# Patient Record
Sex: MALE | Race: WHITE
[De-identification: names, ages, dates, MRNs, and addresses within clinical notes are randomized per-mention and may not be internally consistent; named-entity substitution may affect disease eponyms.]

---

## 2019-02-11 ENCOUNTER — HOSPITAL ENCOUNTER (EMERGENCY)
Dept: HOSPITAL 36 - ER | Age: 49
Discharge: HOME | End: 2019-02-11
Payer: MEDICARE

## 2019-02-11 DIAGNOSIS — R45.851: ICD-10-CM

## 2019-02-11 DIAGNOSIS — F32.9: Primary | ICD-10-CM

## 2019-02-11 DIAGNOSIS — B20: ICD-10-CM

## 2019-02-11 DIAGNOSIS — F10.10: ICD-10-CM

## 2019-02-11 LAB
ALBUMIN SERPL-MCNC: 4.6 GM/DL (ref 4.2–5.5)
ALBUMIN/GLOB SERPL: 1.5 {RATIO} (ref 1–1.8)
ALP SERPL-CCNC: 83 U/L (ref 34–104)
ALT SERPL-CCNC: 50 U/L (ref 7–52)
AMPHET UR-MCNC: NEGATIVE NG/ML
AMYLASE SERPL-CCNC: 39 U/L (ref 29–103)
ANION GAP SERPL CALC-SCNC: 12.9 MMOL/L (ref 7–16)
APPEARANCE UR: CLEAR
AST SERPL-CCNC: 60 U/L (ref 13–39)
BARBITURATES UR-MCNC: NEGATIVE UG/ML
BASOPHILS # BLD AUTO: 0 TH/CUMM (ref 0–0.2)
BASOPHILS NFR BLD AUTO: 0.2 % (ref 0–2)
BENZODIAZEPINES PNL UR: NEGATIVE
BILIRUB SERPL-MCNC: 0.4 MG/DL (ref 0.3–1)
BILIRUB UR-MCNC: NEGATIVE MG/DL
BUN SERPL-MCNC: 16 MG/DL (ref 7–25)
CALCIUM SERPL-MCNC: 9.6 MG/DL (ref 8.6–10.3)
CANNABINOIDS SERPL QL CFM: NEGATIVE
CHLORIDE SERPL-SCNC: 101 MEQ/L (ref 98–107)
CO2 SERPL-SCNC: 27 MEQ/L (ref 21–31)
COCAINE METAB.OTHER UR-MCNC: NEGATIVE NG/ML
COLOR UR: YELLOW
CREAT SERPL-MCNC: 1.2 MG/DL (ref 0.7–1.3)
EOSINOPHIL # BLD AUTO: 0.1 TH/CMM (ref 0.1–0.4)
EOSINOPHIL NFR BLD AUTO: 1.3 % (ref 0–5)
ERYTHROCYTE [DISTWIDTH] IN BLOOD BY AUTOMATED COUNT: 12.8 % (ref 11.5–20)
GLOBULIN SER-MCNC: 3.1 GM/DL
GLUCOSE SERPL-MCNC: 99 MG/DL (ref 70–105)
GLUCOSE UR STRIP-MCNC: NEGATIVE MG/DL
HCT VFR BLD CALC: 45.9 % (ref 41–60)
HGB BLD-MCNC: 15.4 GM/DL (ref 12–16)
KETONES UR STRIP-MCNC: NEGATIVE MG/DL
LEUKOCYTE ESTERASE UR-ACNC: NEGATIVE
LIPASE SERPL-CCNC: 35 U/L (ref 11–82)
LYMPHOCYTE AB SER FC-ACNC: 2 TH/CMM (ref 1.5–3)
LYMPHOCYTES NFR BLD AUTO: 21.1 % (ref 20–50)
MAGNESIUM SERPL-MCNC: 2 MG/DL (ref 1.9–2.7)
MCH RBC QN AUTO: 32.1 PG (ref 26–30)
MCHC RBC AUTO-ENTMCNC: 33.6 PG (ref 28–36)
MCV RBC AUTO: 95.4 FL (ref 80–99)
METHADONE UR CFM-MCNC: NEGATIVE NG/ML
METHAMPHET UR QL: NEGATIVE
MICRO URNS: NO
MONOCYTES # BLD AUTO: 0.6 TH/CMM (ref 0.3–1)
MONOCYTES NFR BLD AUTO: 6 % (ref 2–10)
NEUTROPHILS # BLD: 6.6 TH/CMM (ref 1.8–8)
NEUTROPHILS NFR BLD AUTO: 71.4 % (ref 40–80)
NITRITE UR QL STRIP: NEGATIVE
OPIATES UR QL: NEGATIVE
PCP UR-MCNC: NEGATIVE UG/L
PH UR STRIP: 6 [PH] (ref 4.6–8)
PHOSPHATE SERPL-MCNC: 3 MG/DL (ref 2.5–5)
PLATELET # BLD: 212 TH/CMM (ref 150–400)
PMV BLD AUTO: 8.3 FL
POTASSIUM SERPL-SCNC: 3.9 MEQ/L (ref 3.5–5.1)
PROT UR STRIP-MCNC: NEGATIVE MG/DL
RBC # BLD AUTO: 4.81 MIL/CMM (ref 4.3–5.7)
RBC # UR STRIP: NEGATIVE /UL
SODIUM SERPL-SCNC: 137 MEQ/L (ref 136–145)
SP GR UR STRIP: <= 1.005 (ref 1–1.03)
TRICYCLICS UR QL: NEGATIVE
URINALYSIS COMPLETE PNL UR: (no result)
UROBILINOGEN UR STRIP-ACNC: 0.2 E.U./DL (ref 0.2–1)
WBC # BLD AUTO: 9.3 TH/CMM (ref 4.8–10.8)

## 2019-02-11 PROCEDURE — Z7502: HCPCS

## 2019-02-11 NOTE — ED PHYSICIAN CHART
ED Chief Complaint/HPI





- Patient Information


Date Seen:: 02/11/19


Time Seen:: 11:46


Chief Complaint:: depression


History of Present Illness:: 





depression.  No suicidal ideations or thoughts about hurting himself.





pt biba from home, awake, confused, had etoh, no sob, no cp, has 5/10 pain, 

denies vomiting ,diarrhea.





HIV positive---has not had his medications filled recently, not for monetary 

reason.





No body complaints.





**





states that he called 911 because his roommate was not at home and that he was 

depressed.  no complaints about his body at all.


Allergies:: 


 Allergies











Allergy/AdvReac Type Severity Reaction Status Date / Time


 


No Known Allergies Allergy   Verified 02/11/19 12:23











Vitals:: 


 Vital Signs - 8 hr











  02/11/19 02/11/19





  11:46 13:27


 


Temp 98.3 F 


 


HR 78 68


 


RR 18 18


 


/101 142/89


 


O2 Sat % 97 97











Historian:: Patient


Review:: Nurse's Note Reviewed





ED Review of Systems





- Review of Systems


General/Constitutional: No fever, No chills, No weight loss, No weakness, No 

diaphoresis, No edema, No loss of appetite


Skin: No skin lesions, No rash, No bruising


Head: No headache, No light-headedness


Eyes: No loss of vision, No pain, No diplopia


ENT: No earache, No nasal drainage, No sore throat, No tinnitus


Neck: No neck pain, No swelling, No thyromegaly, No stiffness, No mass noted


Cardio Vascular: No chest pain, No palpitations, No PND, No orthopnea, No edema


Pulmonary: No SOB, No cough, No sputum, No wheezing


GI: Nausea, No vomiting, No diarrhea, No pain, No melena, No hematochezia, No 

constipation, No hematemesis


G/U: No dysuria, No frequency, No hematuria


Musculoskeletal: No bone or joint pain, No back pain, No muscle pain


Endocrine: No polyuria, No polydipsia


Psychiatric: No prior psych history, No depression, No anxiety, No suicidal 

ideation


Hematopoietic: No bruising, No lymphadenopathy


Allergic/Immuno: No urticaria, No angioedema


Neurological: No syncope, No focal symptoms, No weakness, No paresthesia, No 

headache, No seizure, No dizziness, No confusion, No vertigo





ED Past Medical History





- Past Medical History


Obtainable: Yes


Past Medical History: Other (alcohol abuse; HIV positive)


Social History: Alcohol





ED Physical Exam





- Physical Examination


General/Constitutional: Awake, Well-developed, well-nourished, Alert, No 

distress, GCS 15, Non-toxic appearing, Ambulatory


Other Gen/Cons comments:: 





smell of ETOH on breath, but he acts completely sober.


Head: Atraumatic


Eyes: Lids, conjuctiva normal, PERRL, EOMI


Skin: Nl inspection, No rash, No skin lesions, No ecchymosis, Well hydrated, No 

lymphadenopathy


ENMT: External ears, nose nl, TM canals nl, Nasal exam nl, Lips, teeth, gums nl

, Oropharynx nl, Tonsils nl


Neck: Nontender, Full ROM w/o pain, No nuchal rigidity, No stridor


Respiratory: Nl effort/Exclusion, Clear to Auscultation, No Wheeze/Rhonchi/Rales


Cardio Vascular: RRR, No murmur, gallop, rubs, NL S1 S2


GI: No tenderness/rebounding/guarding, No organomegaly, No hernia, Normal BS's, 

Nondistended, No mass/bruits, No McBurney tenderness


: No CVA tenderness


Extremities: No tenderness or effusion, Full ROM, normal strength in all 

extremities, No edema, Normal digits & nails


Neuro/Psych: Alert/oriented, Normal sensory exam, Normal motor strength, 

Judgement/insight normal, Mood normal, Normal gait, No focal deficits


Misc: Normal back, No paraspinal tenderness





ED Labs/Radiology/EKG Results





- Lab Results


Results: 


 Laboratory Tests











  02/11/19 02/11/19 02/11/19





  12:05 12:05 12:05


 


WBC   9.3 


 


RBC   4.81 


 


Hgb   15.4 


 


Hct   45.9 


 


MCV   95.4 


 


MCH   32.1 H 


 


MCHC Differential   33.6 


 


RDW   12.8 


 


Plt Count   212 


 


MPV   8.3 


 


Neutrophils %   71.4 


 


Lymphocytes %   21.1 


 


Monocytes %   6.0 


 


Eosinophils %   1.3 


 


Basophils %   0.2 


 


Sodium    137


 


Potassium    3.9


 


Chloride    101


 


Carbon Dioxide    27.0


 


Anion Gap    12.9


 


BUN    16


 


Creatinine    1.2


 


Est GFR ( Amer)    > 60.0


 


Est GFR (Non-Af Amer)    > 60.0


 


BUN/Creatinine Ratio    13.3


 


Glucose    99


 


Calcium    9.6


 


Phosphorus    3.0


 


Magnesium    2.0


 


Total Bilirubin    0.4


 


AST    60 H


 


ALT    50


 


Alkaline Phosphatase    83


 


Total Protein    7.7


 


Albumin    4.6


 


Globulin    3.1


 


Albumin/Globulin Ratio    1.5


 


Amylase    39


 


Lipase    35


 


Urine Source  CLEAN C  


 


Urine Color  YELLOW  


 


Urine Clarity  CLEAR  


 


Urine pH  6.0  


 


Ur Specific Gravity  <= 1.005  


 


Urine Protein  NEGATIVE  


 


Urine Glucose (UA)  NEGATIVE  


 


Urine Ketones  NEGATIVE  


 


Urine Blood  NEGATIVE  


 


Urine Nitrate  NEGATIVE  


 


Urine Bilirubin  NEGATIVE  


 


Urine Urobilinogen  0.2  


 


Ur Leukocyte Esterase  NEGATIVE  


 


Urine Opiates Screen   


 


Urine Methadone Screen   


 


Ur Barbiturates Screen   


 


Ur Tricyclics Screen   


 


Ur Phencyclidine Scrn   


 


Amphetamines Screen   


 


U Methamphetamines Scrn   


 


U Benzodiazepines Scrn   


 


U Cocaine Metab Screen   


 


U Cannabinoids Screen   


 


Ethyl Alcohol   














  02/11/19 02/11/19





  13:12 13:17


 


WBC  


 


RBC  


 


Hgb  


 


Hct  


 


MCV  


 


MCH  


 


MCHC Differential  


 


RDW  


 


Plt Count  


 


MPV  


 


Neutrophils %  


 


Lymphocytes %  


 


Monocytes %  


 


Eosinophils %  


 


Basophils %  


 


Sodium  


 


Potassium  


 


Chloride  


 


Carbon Dioxide  


 


Anion Gap  


 


BUN  


 


Creatinine  


 


Est GFR ( Amer)  


 


Est GFR (Non-Af Amer)  


 


BUN/Creatinine Ratio  


 


Glucose  


 


Calcium  


 


Phosphorus  


 


Magnesium  


 


Total Bilirubin  


 


AST  


 


ALT  


 


Alkaline Phosphatase  


 


Total Protein  


 


Albumin  


 


Globulin  


 


Albumin/Globulin Ratio  


 


Amylase  


 


Lipase  


 


Urine Source  


 


Urine Color  


 


Urine Clarity  


 


Urine pH  


 


Ur Specific Gravity  


 


Urine Protein  


 


Urine Glucose (UA)  


 


Urine Ketones  


 


Urine Blood  


 


Urine Nitrate  


 


Urine Bilirubin  


 


Urine Urobilinogen  


 


Ur Leukocyte Esterase  


 


Urine Opiates Screen   NEGATIVE


 


Urine Methadone Screen   NEGATIVE


 


Ur Barbiturates Screen   NEGATIVE


 


Ur Tricyclics Screen   NEGATIVE


 


Ur Phencyclidine Scrn   NEGATIVE


 


Amphetamines Screen   NEGATIVE


 


U Methamphetamines Scrn   NEGATIVE


 


U Benzodiazepines Scrn   NEGATIVE


 


U Cocaine Metab Screen   NEGATIVE


 


U Cannabinoids Screen   NEGATIVE


 


Ethyl Alcohol  122 H 














ED Assessment





- Assessment


General Assessment: 





called and spoke with his psychiatrist of many years who can get him in a 

couple of days.  I actually was able to get him a day and time for his visit.





patient acts completely sober.  he was given a bus pass to go home.





ED Septic Shock





- .


Is Septic Shock (SBP<90, OR Lactate>4 mmol\L) present?: No





- <6hrs of presentation:


Vital Signs: 


 Vital Signs - 8 hr











  02/11/19 02/11/19





  11:46 13:27


 


Temp 98.3 F 


 


HR 78 68


 


RR 18 18


 


/101 142/89


 


O2 Sat % 97 97














ED Reassessment (Disposition)





- Reassessment


Reassessment Condition:: Improved





- Diagnosis


Diagnosis:: 


Depression with SI ideations





Alchol abuse





HIV positive





- Aftercare/Follow up Instructions


Aftercare/Follow-Up Instructions:: Refer to Discharge Instructions


Notes:: 





follow up with your psychiatrist, Dr. Ozuna, on 2/13/2019 at 3 p.m. and also 

with [facility].





Avoid anything that will irritate your stomach.  Follow a bland diet.  Do not 

drink so much alcohol.


Medication Prescribed:: 


Ranitidine 150 mg po bid # 60





Zofran 4 mg ODT q 8 hours prn # 16





- Patient Disposition


Discharge/Transfer:: Home


Condition at Disposition:: Stable, Improved

## 2020-01-01 ENCOUNTER — HOSPITAL ENCOUNTER (EMERGENCY)
Dept: HOSPITAL 54 - ER | Age: 50
Discharge: HOME | End: 2020-01-01
Payer: MEDICARE

## 2020-01-01 VITALS — SYSTOLIC BLOOD PRESSURE: 112 MMHG | DIASTOLIC BLOOD PRESSURE: 76 MMHG

## 2020-01-01 VITALS — WEIGHT: 211 LBS | HEIGHT: 69 IN | BODY MASS INDEX: 31.25 KG/M2
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
## 2020-01-01 DIAGNOSIS — S00.81XA: Primary | ICD-10-CM
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
## 2020-01-01 DIAGNOSIS — F10.129: ICD-10-CM
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
## 2020-01-01 PROCEDURE — 99284 EMERGENCY DEPT VISIT MOD MDM: CPT

## 2020-01-01 PROCEDURE — 96372 THER/PROPH/DIAG INJ SC/IM: CPT

## 2020-01-01 PROCEDURE — 90715 TDAP VACCINE 7 YRS/> IM: CPT

## 2020-01-01 PROCEDURE — 70450 CT HEAD/BRAIN W/O DYE: CPT

## 2020-01-01 PROCEDURE — 90471 IMMUNIZATION ADMIN: CPT

## 2020-01-01 NOTE — NUR
Patient discharged to home in stable condition. Written and verbal after care 
instructions given. Patient verbalizes understanding of instruction. PT 
ambulatory with a steady gait. vss.

## 2020-01-01 NOTE — NUR
PT CONSTANTLY ATTEMPTING TO GET OUT OF BED. PT SPEAKING IN INCOHERENT 
SENTENCES. PT REMAINS AGGITATED, THREATENING TO LEAVE HOSPITAL. MD AT BEDSIDE

## 2020-01-01 NOTE — NUR
PT IS SLEEPING COMFORTABLY IN BED 6. EASILY AROUSED THROUGH TACTILE AND VERBAL 
STIMULI. NO SOB. BREATHING EVENLY AND UNLABORED. CALL LIGHT WITHIN REACH. 
CONNECTED TO CARDIAC MONITOR.
